# Patient Record
Sex: MALE | Race: WHITE | NOT HISPANIC OR LATINO | Employment: FULL TIME | URBAN - METROPOLITAN AREA
[De-identification: names, ages, dates, MRNs, and addresses within clinical notes are randomized per-mention and may not be internally consistent; named-entity substitution may affect disease eponyms.]

---

## 2025-04-01 ENCOUNTER — TELEPHONE (OUTPATIENT)
Dept: PRIMARY CARE | Facility: CLINIC | Age: 30
End: 2025-04-01

## 2025-04-01 ENCOUNTER — OFFICE VISIT (OUTPATIENT)
Dept: URGENT CARE | Age: 30
End: 2025-04-01
Payer: COMMERCIAL

## 2025-04-01 VITALS
DIASTOLIC BLOOD PRESSURE: 80 MMHG | BODY MASS INDEX: 28.17 KG/M2 | RESPIRATION RATE: 16 BRPM | WEIGHT: 188 LBS | SYSTOLIC BLOOD PRESSURE: 118 MMHG | OXYGEN SATURATION: 98 % | HEART RATE: 54 BPM | TEMPERATURE: 97.6 F

## 2025-04-01 DIAGNOSIS — N48.89 PENILE IRRITATION: ICD-10-CM

## 2025-04-01 DIAGNOSIS — R30.0 BURNING WITH URINATION: Primary | ICD-10-CM

## 2025-04-01 LAB
POC APPEARANCE, URINE: CLEAR
POC BILIRUBIN, URINE: NEGATIVE
POC BLOOD, URINE: NEGATIVE
POC COLOR, URINE: YELLOW
POC GLUCOSE, URINE: NEGATIVE MG/DL
POC KETONES, URINE: NEGATIVE MG/DL
POC LEUKOCYTES, URINE: NEGATIVE
POC NITRITE,URINE: NEGATIVE
POC PH, URINE: 6 PH
POC PROTEIN, URINE: NEGATIVE MG/DL
POC SPECIFIC GRAVITY, URINE: 1.01
POC UROBILINOGEN, URINE: 0.2 EU/DL

## 2025-04-01 PROCEDURE — 99203 OFFICE O/P NEW LOW 30 MIN: CPT | Performed by: REGISTERED NURSE

## 2025-04-01 PROCEDURE — 81003 URINALYSIS AUTO W/O SCOPE: CPT | Performed by: REGISTERED NURSE

## 2025-04-01 ASSESSMENT — ENCOUNTER SYMPTOMS
FREQUENCY: 0
ABDOMINAL PAIN: 0
FLANK PAIN: 0
DYSURIA: 1

## 2025-04-01 NOTE — PROGRESS NOTES
Subjective   Patient ID: Ganga Murillo is a 30 y.o. male. They present today with a chief complaint of burning when urinating  (For 1 day).    History of Present Illness    History provided by:  Patient  Difficulty Urinating  Presenting symptoms: dysuria and penile pain    Presenting symptoms: no penile discharge, no scrotal pain and no swelling    Context: during urination    Associated symptoms: penile redness    Associated symptoms: no abdominal pain, no flank pain, no penile swelling, no scrotal swelling and no urinary frequency        Past Medical History  Allergies as of 04/01/2025    (No Known Allergies)       (Not in a hospital admission)       No past medical history on file.    No past surgical history on file.         Review of Systems  Review of Systems   Gastrointestinal:  Negative for abdominal pain.   Genitourinary:  Positive for dysuria and penile pain. Negative for flank pain, frequency, penile discharge, penile swelling, scrotal swelling, testicular pain and urgency.                                  Objective    Vitals:    04/01/25 1024   BP: 118/80   BP Location: Left arm   Patient Position: Sitting   BP Cuff Size: Large adult   Pulse: 54   Resp: 16   Temp: 36.4 °C (97.6 °F)   TempSrc: Temporal   SpO2: 98%   Weight: 85.3 kg (188 lb)     No LMP for male patient.    Physical Exam  Vitals and nursing note reviewed.   Abdominal:      General: Abdomen is flat. Bowel sounds are normal.      Palpations: Abdomen is soft.      Tenderness: There is no abdominal tenderness.         Procedures    Point of Care Test & Imaging Results from this visit  Results for orders placed or performed in visit on 04/01/25   POCT UA Automated manually resulted   Result Value Ref Range    POC Color, Urine Yellow Straw, Yellow, Light-Yellow    POC Appearance, Urine Clear Clear    POC Glucose, Urine NEGATIVE NEGATIVE mg/dl    POC Bilirubin, Urine NEGATIVE NEGATIVE    POC Ketones, Urine NEGATIVE NEGATIVE mg/dl    POC  Specific Gravity, Urine 1.010 1.005 - 1.035    POC Blood, Urine NEGATIVE NEGATIVE    POC PH, Urine 6.0 No Reference Range Established PH    POC Protein, Urine NEGATIVE NEGATIVE mg/dl    POC Urobilinogen, Urine 0.2 0.2, 1.0 EU/DL    Poc Nitrite, Urine NEGATIVE NEGATIVE    POC Leukocytes, Urine NEGATIVE NEGATIVE      Imaging  No results found.    Cardiology, Vascular, and Other Imaging  No other imaging results found for the past 2 days      Diagnostic study results (if any) were reviewed by Crystal L Severino, APRN-CNP.    Assessment/Plan   Allergies, medications, history, and pertinent labs/EKGs/Imaging reviewed by Crystal L Severino, APRN-CNP.     Medical Decision Making  30-year-old male patient presents with chief complaint of burning with urination.  Patient states symptoms started yesterday.  He denies any penile discharge, testicular swelling or pain, rashes or blisters.  Patient states he did notice a little bit of red irritation at the tip of his penis.  Patient is concerned over possible STI.  After speaking with patient and the responses to my questions I am not suspecting an STI at this time.  Urine is obtained and is negative for infection or blood; culture is pending.   Explained to patient he can use Aquaphor to the area until healed and we discussed prevention of STIs by using condoms as protection, patient verbalizes understanding and has no further questions.       Orders and Diagnoses  Diagnoses and all orders for this visit:  Burning with urination  -     POCT UA Automated manually resulted  Penile irritation      Medical Admin Record      Patient disposition: Home    Electronically signed by Crystal L Severino, APRN-CNP  10:56 AM

## 2025-04-01 NOTE — TELEPHONE ENCOUNTER
Ganga is calling to schedule a appointment. He was last seen in our office with Dr. Botello on 08/06/21. Is it ok to get him scheduled as a new PT?    Ganga's # [phone]

## 2025-04-02 ENCOUNTER — APPOINTMENT (OUTPATIENT)
Dept: PRIMARY CARE | Facility: CLINIC | Age: 30
End: 2025-04-02

## 2025-04-02 NOTE — TELEPHONE ENCOUNTER
I called Ganga, left a detailed message that  Will see him as a new PT, and to call us back to get scheduled.    Ganga's # [phone]

## 2025-04-03 ENCOUNTER — OFFICE VISIT (OUTPATIENT)
Dept: URGENT CARE | Age: 30
End: 2025-04-03
Payer: COMMERCIAL

## 2025-04-03 VITALS
OXYGEN SATURATION: 97 % | DIASTOLIC BLOOD PRESSURE: 75 MMHG | TEMPERATURE: 97.5 F | RESPIRATION RATE: 16 BRPM | SYSTOLIC BLOOD PRESSURE: 123 MMHG | BODY MASS INDEX: 27.72 KG/M2 | HEART RATE: 64 BPM | WEIGHT: 185 LBS

## 2025-04-03 DIAGNOSIS — R30.0 BURNING WITH URINATION: Primary | ICD-10-CM

## 2025-04-03 DIAGNOSIS — N34.2 URETHRITIS: Primary | ICD-10-CM

## 2025-04-03 DIAGNOSIS — J02.9 SORE THROAT: ICD-10-CM

## 2025-04-03 DIAGNOSIS — R30.0 DYSURIA: ICD-10-CM

## 2025-04-03 LAB
BACTERIA UR CULT: NORMAL
POC APPEARANCE, URINE: CLEAR
POC BILIRUBIN, URINE: NEGATIVE
POC BLOOD, URINE: NEGATIVE
POC COLOR, URINE: YELLOW
POC GLUCOSE, URINE: NEGATIVE MG/DL
POC KETONES, URINE: ABNORMAL MG/DL
POC LEUKOCYTES, URINE: ABNORMAL
POC NITRITE,URINE: NEGATIVE
POC PH, URINE: 6.5 PH
POC PROTEIN, URINE: ABNORMAL MG/DL
POC RAPID STREP: NEGATIVE
POC SPECIFIC GRAVITY, URINE: 1.01
POC UROBILINOGEN, URINE: 0.2 EU/DL

## 2025-04-03 PROCEDURE — 1036F TOBACCO NON-USER: CPT

## 2025-04-03 PROCEDURE — 81003 URINALYSIS AUTO W/O SCOPE: CPT

## 2025-04-03 PROCEDURE — 87880 STREP A ASSAY W/OPTIC: CPT

## 2025-04-03 PROCEDURE — 99214 OFFICE O/P EST MOD 30 MIN: CPT

## 2025-04-03 RX ORDER — SULFAMETHOXAZOLE AND TRIMETHOPRIM 800; 160 MG/1; MG/1
1 TABLET ORAL 2 TIMES DAILY
Qty: 20 TABLET | Refills: 0 | Status: SHIPPED | OUTPATIENT
Start: 2025-04-03 | End: 2025-04-13

## 2025-04-03 ASSESSMENT — ENCOUNTER SYMPTOMS
DYSURIA: 1
SORE THROAT: 1

## 2025-04-03 NOTE — TELEPHONE ENCOUNTER
I see PT is scheduled on 04/07/25 for a NP sick visit follow up, seen at  in [place] for burning with urination on 04/01/25.

## 2025-04-03 NOTE — PATIENT INSTRUCTIONS
Start Bactrim as directed follow-up with primary care doctor in 1 to 2 days will get urine culture results in chlamydia gonorrhea and trichomonas results in 2 to 3 days we will call with results or call her office at [phone] if you do not hear from us if chlamydia gonorrhea or trichomonas are positive you will need treatment for those referral for urology also put in .  Refrain from intercourse until STD tests are resulted Go to the emergency room with any worsening symptoms increase your fluids

## 2025-04-03 NOTE — PROGRESS NOTES
Subjective   Patient ID: Ganga Murillo is a 30 y.o. male. They present today with a chief complaint of Burning with uriantion (Here 4/1 for same issue; directed to see PCP if got worse and he can't get an appt with PCP until next week; burning with urination got worse, slight discharge and sore throat. Requesting STD testing).    History of Present Illness  HPI    Past Medical History  Allergies as of 04/03/2025    (No Known Allergies)       (Not in a hospital admission)       No past medical history on file.    No past surgical history on file.     reports that he has never smoked. He has never used smokeless tobacco. He reports current alcohol use. He reports that he does not use drugs.    Review of Systems  Review of Systems   HENT:  Positive for sore throat.    Genitourinary:  Positive for dysuria and penile discharge.   All other systems reviewed and are negative.                                 Objective    Vitals:    04/03/25 1031   BP: 123/75   BP Location: Left arm   Patient Position: Sitting   BP Cuff Size: Adult   Pulse: 64   Resp: 16   Temp: 36.4 °C (97.5 °F)   TempSrc: Temporal   SpO2: 97%   Weight: 83.9 kg (185 lb)     No LMP for male patient.    Physical Exam  Vitals reviewed.   Constitutional:       Appearance: Normal appearance.   HENT:      Head: Normocephalic and atraumatic.      Mouth/Throat:      Mouth: Mucous membranes are moist.      Pharynx: Oropharynx is clear. Posterior oropharyngeal erythema present.   Eyes:      Extraocular Movements: Extraocular movements intact.      Conjunctiva/sclera: Conjunctivae normal.      Pupils: Pupils are equal, round, and reactive to light.   Cardiovascular:      Rate and Rhythm: Normal rate and regular rhythm.      Pulses: Normal pulses.      Heart sounds: Normal heart sounds.   Pulmonary:      Effort: Pulmonary effort is normal.      Breath sounds: Normal breath sounds.   Abdominal:      General: Abdomen is flat. Bowel sounds are normal.       Palpations: Abdomen is soft.   Musculoskeletal:         General: Normal range of motion.      Cervical back: Normal range of motion and neck supple.   Skin:     General: Skin is warm.      Capillary Refill: Capillary refill takes less than 2 seconds.   Neurological:      General: No focal deficit present.      Mental Status: He is alert and oriented to person, place, and time.   Psychiatric:         Mood and Affect: Mood normal.         Behavior: Behavior normal.         Procedures    Point of Care Test & Imaging Results from this visit  Results for orders placed or performed in visit on 04/03/25   POCT UA Automated manually resulted   Result Value Ref Range    POC Color, Urine Yellow Straw, Yellow, Light-Yellow    POC Appearance, Urine Clear Clear    POC Glucose, Urine NEGATIVE NEGATIVE mg/dl    POC Bilirubin, Urine NEGATIVE NEGATIVE    POC Ketones, Urine TRACE (A) NEGATIVE mg/dl    POC Specific Gravity, Urine 1.015 1.005 - 1.035    POC Blood, Urine NEGATIVE NEGATIVE    POC PH, Urine 6.5 No Reference Range Established PH    POC Protein, Urine TRACE (A) NEGATIVE mg/dl    POC Urobilinogen, Urine 0.2 0.2, 1.0 EU/DL    Poc Nitrite, Urine NEGATIVE NEGATIVE    POC Leukocytes, Urine TRACE (A) NEGATIVE      Imaging  No results found.    Cardiology, Vascular, and Other Imaging  No other imaging results found for the past 2 days      Diagnostic study results (if any) were reviewed by KHOI Betts.    Assessment/Plan   Allergies, medications, history, and pertinent labs/EKGs/Imaging reviewed by KHOI Betts.     Medical Decision Making  30-year-old male presents after he was seen here on 4 /1 for dysuria.  UA was negative and urine culture was negative the last time he was here on exam today patient is in no acute distress vital signs are stable heart rate is regular lungs are clear bilaterally patient is afebrile he does report the dysuria continued and he noticed some urinary  discharge penile denies fever chills denies back pain abdominal pain on exam patient is no acute distress abdomen is soft nontender there is no CVA tenderness no flank pain he did do a home UTI test that came back positive urine today shows mild ketones mild leukocytes patient's reports that his girlfriend tested negative for STDs and he has not been with any other partner so we will treat empirically for UTI pending culture with Bactrim as directed patient would like antibiotics for UTI patient is encouraged to refrain from intercourse until STD panel of chlamydia gonorrhea trichomonas comes back if those are positive new treatment will have to be given and patient agrees with that patient is going out of town and wanted treated his primary care doctor also ordered GC chlamydia urine test outpatient lab sent a message to his doctor and did not hear back so patient requested to be treated here and have his GC chlamydia and trichomonas test sent out from her urgent care patient to call in 2 to 3 days to get results if he does not hear from us go to the emergency department any worsening symptoms and follow-up with primary care doctor 1 to 2 days patient agrees with plan of care increase fluids patient left in stable condition    Orders and Diagnoses  Diagnoses and all orders for this visit:  Burning with urination  -     POCT UA Automated manually resulted      Medical Admin Record      Patient disposition: Home    Electronically signed by MATTIE Betts-CNP  11:05 AM

## 2025-04-04 ENCOUNTER — APPOINTMENT (OUTPATIENT)
Dept: PRIMARY CARE | Facility: CLINIC | Age: 30
End: 2025-04-04

## 2025-04-04 LAB
C TRACH RRNA SPEC QL NAA+PROBE: NOT DETECTED
N GONORRHOEA RRNA SPEC QL NAA+PROBE: NOT DETECTED
QUEST GC CT AMPLIFIED (ALWAYS MESSAGE): NORMAL
T VAGINALIS RRNA SPEC QL NAA+PROBE: NOT DETECTED

## 2025-04-05 ENCOUNTER — TELEPHONE (OUTPATIENT)
Dept: URGENT CARE | Age: 30
End: 2025-04-05

## 2025-04-05 LAB — BACTERIA UR CULT: NORMAL

## 2025-04-05 NOTE — TELEPHONE ENCOUNTER
Patient given negative results if symptoms persist follow up with urology, go to ED with worsening symptoms, patient reports feeling a little better.  Patient agrees with plan of care.

## 2025-04-07 ENCOUNTER — OFFICE VISIT (OUTPATIENT)
Dept: PRIMARY CARE | Facility: CLINIC | Age: 30
End: 2025-04-07
Payer: COMMERCIAL

## 2025-04-07 ENCOUNTER — APPOINTMENT (OUTPATIENT)
Dept: PRIMARY CARE | Facility: CLINIC | Age: 30
End: 2025-04-07

## 2025-04-07 VITALS
OXYGEN SATURATION: 98 % | TEMPERATURE: 98.4 F | HEIGHT: 68 IN | HEART RATE: 60 BPM | DIASTOLIC BLOOD PRESSURE: 80 MMHG | SYSTOLIC BLOOD PRESSURE: 120 MMHG | WEIGHT: 193.2 LBS | BODY MASS INDEX: 29.28 KG/M2

## 2025-04-07 DIAGNOSIS — H10.33 ACUTE BACTERIAL CONJUNCTIVITIS OF BOTH EYES: ICD-10-CM

## 2025-04-07 DIAGNOSIS — N34.2 URETHRITIS: Primary | ICD-10-CM

## 2025-04-07 DIAGNOSIS — R30.9 PAINFUL URINATION: ICD-10-CM

## 2025-04-07 LAB
POC APPEARANCE, URINE: CLEAR
POC BILIRUBIN, URINE: NEGATIVE
POC BLOOD, URINE: NEGATIVE
POC COLOR, URINE: YELLOW
POC GLUCOSE, URINE: NEGATIVE MG/DL
POC KETONES, URINE: NEGATIVE MG/DL
POC LEUKOCYTES, URINE: NEGATIVE
POC NITRITE,URINE: NEGATIVE
POC PH, URINE: 6.5 PH
POC PROTEIN, URINE: NEGATIVE MG/DL
POC SPECIFIC GRAVITY, URINE: 1.01
POC UROBILINOGEN, URINE: 0.2 EU/DL

## 2025-04-07 PROCEDURE — 99203 OFFICE O/P NEW LOW 30 MIN: CPT | Performed by: FAMILY MEDICINE

## 2025-04-07 PROCEDURE — 1036F TOBACCO NON-USER: CPT | Performed by: FAMILY MEDICINE

## 2025-04-07 PROCEDURE — 81003 URINALYSIS AUTO W/O SCOPE: CPT | Performed by: FAMILY MEDICINE

## 2025-04-07 PROCEDURE — 3008F BODY MASS INDEX DOCD: CPT | Performed by: FAMILY MEDICINE

## 2025-04-07 RX ORDER — DOXYCYCLINE 100 MG/1
100 CAPSULE ORAL 2 TIMES DAILY
Qty: 14 CAPSULE | Refills: 0 | Status: SHIPPED | OUTPATIENT
Start: 2025-04-07 | End: 2025-04-14

## 2025-04-07 RX ORDER — CIPROFLOXACIN 500 MG/1
500 TABLET ORAL 2 TIMES DAILY
Qty: 6 TABLET | Refills: 0 | Status: SHIPPED | OUTPATIENT
Start: 2025-04-07 | End: 2025-04-10

## 2025-04-07 ASSESSMENT — ENCOUNTER SYMPTOMS
LOSS OF SENSATION IN FEET: 0
VOMITING: 0
DEPRESSION: 0
FREQUENCY: 1
OCCASIONAL FEELINGS OF UNSTEADINESS: 0

## 2025-04-07 ASSESSMENT — COLUMBIA-SUICIDE SEVERITY RATING SCALE - C-SSRS
2. HAVE YOU ACTUALLY HAD ANY THOUGHTS OF KILLING YOURSELF?: NO
6. HAVE YOU EVER DONE ANYTHING, STARTED TO DO ANYTHING, OR PREPARED TO DO ANYTHING TO END YOUR LIFE?: NO
1. IN THE PAST MONTH, HAVE YOU WISHED YOU WERE DEAD OR WISHED YOU COULD GO TO SLEEP AND NOT WAKE UP?: NO

## 2025-04-07 ASSESSMENT — PATIENT HEALTH QUESTIONNAIRE - PHQ9
2. FEELING DOWN, DEPRESSED OR HOPELESS: NOT AT ALL
SUM OF ALL RESPONSES TO PHQ9 QUESTIONS 1 AND 2: 0
1. LITTLE INTEREST OR PLEASURE IN DOING THINGS: NOT AT ALL

## 2025-04-07 ASSESSMENT — PAIN SCALES - GENERAL: PAINLEVEL_OUTOF10: 3

## 2025-04-07 NOTE — PROGRESS NOTES
"Subjective   Patient ID: Ganga Murillo is a 30 y.o. male who presents for Establish Care.    Urinary Frequency   This is a new problem. The problem has been gradually worsening. The quality of the pain is described as burning. The pain is at a severity of 3/10. Associated symptoms include frequency and urgency. Pertinent negatives include no vomiting.    + sore throat, resolved   Red eyes   + sexual active  2 partners  Gc chalymdia neg and also tricomonas  1 partner has similar symptoms such as vaginal discharge and red eyes  Patient denies any arthritis symptoms    Initial GC and chlamydia after urine sample was negative done at the urgent care        Review of Systems   Gastrointestinal:  Negative for vomiting.   Genitourinary:  Positive for frequency and urgency.       Objective   /80   Pulse 60   Temp 36.9 °C (98.4 °F) (Temporal)   Ht 1.727 m (5' 8\")   Wt 87.6 kg (193 lb 3.2 oz)   SpO2 98%   BMI 29.38 kg/m²     Physical Exam  Vitals reviewed.   Constitutional:       Appearance: Normal appearance.   Cardiovascular:      Rate and Rhythm: Normal rate and regular rhythm.      Heart sounds: Normal heart sounds. No murmur heard.  Pulmonary:      Breath sounds: Normal breath sounds.   Abdominal:      General: Abdomen is flat. Bowel sounds are normal.      Palpations: Abdomen is soft.   Genitourinary:     Penis: Circumcised. Phimosis, erythema and discharge (Clear) present. No paraphimosis, hypospadias, tenderness, swelling or lesions.    Musculoskeletal:         General: No swelling.   Neurological:      Mental Status: He is alert.         Assessment/Plan   Diagnoses and all orders for this visit:  Urethritis  -     doxycycline (Vibramycin) 100 mg capsule; Take 1 capsule (100 mg) by mouth 2 times a day for 7 days. Take with at least 8 ounces (large glass) of water, do not lie down for 30 minutes after  -     ciprofloxacin (Cipro) 500 mg tablet; Take 1 tablet (500 mg) by mouth 2 times a day for 3 " days.  -     C. trachomatis / N. gonorrhoeae, Amplified, Urogenital  -     HIV 1/2 Antigen/Antibody Screen with Reflex to Confirmation; Future  -     QUEST HSV, TYPE 1 AND 2 DNA, QL REAL TIME PCR  Painful urination  -     POCT UA Automated manually resulted  Acute bacterial conjunctivitis of both eyes    Patient was treated with Bactrim for symptoms with no improvement  In light of his symptoms of a urethral discharge red eyes and urethritis symptoms but no arthritis symptoms likely has chlamydia, started on doxycycline 100 mg twice daily for 7 days  Started on Cipro 500 mg twice daily x 3 days to cover for gonorrhea  Her urethral swab was done today  He is to avoid sexual activity until we know the results of these tests  Counseled on safe sex practices  Stop Bactrim  , Is on the use of condoms 100% of the time

## 2025-04-08 LAB — C TRACH RRNA SPEC QL NAA+PROBE: NORMAL

## 2025-04-09 ENCOUNTER — CLINICAL SUPPORT (OUTPATIENT)
Dept: PRIMARY CARE | Facility: CLINIC | Age: 30
End: 2025-04-09
Payer: COMMERCIAL

## 2025-04-09 DIAGNOSIS — N34.2 URETHRITIS: Primary | ICD-10-CM

## 2025-04-09 DIAGNOSIS — N34.2 URETHRITIS: ICD-10-CM

## 2025-04-09 LAB
HIV 1+2 AB+HIV1 P24 AG SERPL QL IA: NORMAL
HSV1 DNA SPEC QL NAA+PROBE: NOT DETECTED
HSV2 DNA SPEC QL NAA+PROBE: NOT DETECTED
SPECIMEN SOURCE: NORMAL

## 2025-04-10 LAB
C TRACH RRNA SPEC QL NAA+PROBE: NOT DETECTED
N GONORRHOEA RRNA SPEC QL NAA+PROBE: NOT DETECTED
QUEST GC CT AMPLIFIED (ALWAYS MESSAGE): NORMAL